# Patient Record
Sex: FEMALE | Race: WHITE | NOT HISPANIC OR LATINO | Employment: FULL TIME | URBAN - NONMETROPOLITAN AREA
[De-identification: names, ages, dates, MRNs, and addresses within clinical notes are randomized per-mention and may not be internally consistent; named-entity substitution may affect disease eponyms.]

---

## 2023-10-09 ENCOUNTER — OFFICE VISIT (OUTPATIENT)
Dept: URGENT CARE | Facility: PHYSICIAN GROUP | Age: 33
End: 2023-10-09
Payer: OTHER GOVERNMENT

## 2023-10-09 VITALS
BODY MASS INDEX: 29.09 KG/M2 | WEIGHT: 181 LBS | OXYGEN SATURATION: 97 % | HEIGHT: 66 IN | SYSTOLIC BLOOD PRESSURE: 106 MMHG | HEART RATE: 80 BPM | TEMPERATURE: 97.8 F | DIASTOLIC BLOOD PRESSURE: 70 MMHG | RESPIRATION RATE: 16 BRPM

## 2023-10-09 DIAGNOSIS — H10.9 BACTERIAL CONJUNCTIVITIS OF RIGHT EYE: ICD-10-CM

## 2023-10-09 PROCEDURE — 99203 OFFICE O/P NEW LOW 30 MIN: CPT | Performed by: NURSE PRACTITIONER

## 2023-10-09 PROCEDURE — 3078F DIAST BP <80 MM HG: CPT | Performed by: NURSE PRACTITIONER

## 2023-10-09 PROCEDURE — 3074F SYST BP LT 130 MM HG: CPT | Performed by: NURSE PRACTITIONER

## 2023-10-09 RX ORDER — POLYMYXIN B SULFATE AND TRIMETHOPRIM 1; 10000 MG/ML; [USP'U]/ML
1 SOLUTION OPHTHALMIC EVERY 4 HOURS
Qty: 4 ML | Refills: 0 | Status: SHIPPED | OUTPATIENT
Start: 2023-10-09 | End: 2023-10-17 | Stop reason: SDUPTHER

## 2023-10-09 ASSESSMENT — ENCOUNTER SYMPTOMS
FEVER: 0
EYE DISCHARGE: 1
DIZZINESS: 0
PHOTOPHOBIA: 0
EYE REDNESS: 1
HEADACHES: 0
EYE PAIN: 0
CHILLS: 0
BLURRED VISION: 1
DOUBLE VISION: 0

## 2023-10-09 NOTE — PROGRESS NOTES
"Subjective:   Razia Car is a 33 y.o. female who presents for Eye Problem (Right eye, scratchy and red, swollen shut this morning, crusty and goopy/Second time this has happened)    Patient is a 33-year-old female presenting clinic with 1 day history of right eye scratchy feeling, redness, mild eyelid swelling, and matting noted this morning.  She states the second time it happened in the past 30 days.  Last time it did clear up on its own after about 1 week.  She does have mild blurred vision in her right eye.  Denies any fever, chills, double vision, eye pain, headaches, or dizziness  Tried over-the-counter eyedrops with some symptom relief.  She denies any injury or trauma to the eye.  She denies any contact use.  Her son does go to  however he does not exhibit any symptoms.    Review of Systems   Constitutional:  Negative for chills and fever.   Eyes:  Positive for blurred vision, discharge and redness. Negative for double vision, photophobia and pain.   Neurological:  Negative for dizziness and headaches.       Medications, Allergies, and current problem list reviewed today in Epic.     Objective:     /70   Pulse 80   Temp 36.6 °C (97.8 °F) (Temporal)   Resp 16   Ht 1.676 m (5' 6\")   Wt 82.1 kg (181 lb)   SpO2 97%     Physical Exam  Vitals reviewed.   Constitutional:       Appearance: Normal appearance.   HENT:      Head: Normocephalic.      Nose: Nose normal.      Mouth/Throat:      Mouth: Mucous membranes are moist.   Eyes:      Extraocular Movements: Extraocular movements intact.      Conjunctiva/sclera:      Right eye: Right conjunctiva is injected. Exudate present.      Left eye: Left conjunctiva is not injected. No exudate.     Pupils: Pupils are equal, round, and reactive to light.   Cardiovascular:      Rate and Rhythm: Normal rate.   Pulmonary:      Effort: Pulmonary effort is normal.   Musculoskeletal:         General: Normal range of motion.      Cervical back: Normal range of " motion and neck supple.   Skin:     General: Skin is warm and dry.   Neurological:      Mental Status: She is alert and oriented to person, place, and time.   Psychiatric:         Mood and Affect: Mood normal.         Behavior: Behavior normal.         Thought Content: Thought content normal.         Judgment: Judgment normal.         Assessment/Plan:     Diagnosis and associated orders:     1. Bacterial conjunctivitis of right eye  polymixin-trimethoprim (POLYTRIM) 00677-8.1 UNIT/ML-% Solution         Comments/MDM:     HPI and physical exam findings are consistent with bacterial conjunctivitis of right eye.  Recommended warm compresses, baby shampoo, and Polytrim was prescribed.  Side effects medication appropriate administration were discussed.  OTC Tylenol or Motrin for fever/discomfort.  Avoid touching eyes  Hand Hygiene   Follow-up with PCP  AVS printed  Return to clinic or go to the ED if symptoms worsen or fail to improve, or if patient should develop worsening/increasing/persistent eye redness, eye drainage, eye pain, eye itchiness, vision changes, periorbital redness or swelling, headache, fever/chills, and/or any concerning symptoms.        Differential diagnosis, natural history, supportive care, and indications for immediate follow-up discussed.    Advised the patient to follow-up with the primary care physician for recheck, reevaluation, and consideration of further management.    Please note that this dictation was created using voice recognition software. I have made a reasonable attempt to correct obvious errors, but I expect that there are errors of grammar and possibly content that I did not discover before finalizing the note.

## 2023-10-16 ENCOUNTER — TELEPHONE (OUTPATIENT)
Dept: URGENT CARE | Facility: PHYSICIAN GROUP | Age: 33
End: 2023-10-16

## 2023-10-17 DIAGNOSIS — H10.9 BACTERIAL CONJUNCTIVITIS OF RIGHT EYE: ICD-10-CM

## 2023-10-17 RX ORDER — POLYMYXIN B SULFATE AND TRIMETHOPRIM 1; 10000 MG/ML; [USP'U]/ML
1 SOLUTION OPHTHALMIC EVERY 4 HOURS
Qty: 4 ML | Refills: 0 | Status: SHIPPED | OUTPATIENT
Start: 2023-10-17 | End: 2023-10-27